# Patient Record
Sex: FEMALE | Race: WHITE | NOT HISPANIC OR LATINO | Employment: UNEMPLOYED | URBAN - METROPOLITAN AREA
[De-identification: names, ages, dates, MRNs, and addresses within clinical notes are randomized per-mention and may not be internally consistent; named-entity substitution may affect disease eponyms.]

---

## 2023-01-01 ENCOUNTER — NURSING HOME VISIT (OUTPATIENT)
Dept: POST ACUTE CARE | Facility: EXTERNAL LOCATION | Age: 88
End: 2023-01-01
Payer: MEDICARE

## 2023-01-01 DIAGNOSIS — I10 HYPERTENSION, UNSPECIFIED TYPE: ICD-10-CM

## 2023-01-01 DIAGNOSIS — K21.9 GASTROESOPHAGEAL REFLUX DISEASE WITHOUT ESOPHAGITIS: ICD-10-CM

## 2023-01-01 DIAGNOSIS — E87.6 HYPOKALEMIA: ICD-10-CM

## 2023-01-01 DIAGNOSIS — E11.9 TYPE 2 DIABETES MELLITUS WITHOUT COMPLICATION, WITHOUT LONG-TERM CURRENT USE OF INSULIN (MULTI): ICD-10-CM

## 2023-01-01 DIAGNOSIS — S91.002A WOUND OF LEFT ANKLE, INITIAL ENCOUNTER: ICD-10-CM

## 2023-01-01 DIAGNOSIS — R63.4 WEIGHT LOSS: ICD-10-CM

## 2023-01-01 DIAGNOSIS — R60.0 EDEMA OF LEFT LOWER EXTREMITY: Primary | ICD-10-CM

## 2023-01-01 DIAGNOSIS — K92.1 BLACK TARRY STOOLS: ICD-10-CM

## 2023-01-01 DIAGNOSIS — J18.9 PNEUMONIA DUE TO INFECTIOUS ORGANISM, UNSPECIFIED LATERALITY, UNSPECIFIED PART OF LUNG: Primary | ICD-10-CM

## 2023-01-01 DIAGNOSIS — I10 HYPERTENSION, ESSENTIAL: ICD-10-CM

## 2023-01-01 DIAGNOSIS — I10 PRIMARY HYPERTENSION: ICD-10-CM

## 2023-01-01 DIAGNOSIS — I21.4 NON-ST ELEVATION (NSTEMI) MYOCARDIAL INFARCTION (MULTI): ICD-10-CM

## 2023-01-01 DIAGNOSIS — J18.9 PNEUMONIA DUE TO INFECTIOUS ORGANISM, UNSPECIFIED LATERALITY, UNSPECIFIED PART OF LUNG: ICD-10-CM

## 2023-01-01 DIAGNOSIS — E11.9 TYPE 2 DIABETES MELLITUS WITHOUT COMPLICATION, WITH LONG-TERM CURRENT USE OF INSULIN (MULTI): ICD-10-CM

## 2023-01-01 DIAGNOSIS — K62.5 RECTAL BLEEDING: Primary | ICD-10-CM

## 2023-01-01 DIAGNOSIS — S91.002D WOUND OF LEFT ANKLE, SUBSEQUENT ENCOUNTER: ICD-10-CM

## 2023-01-01 DIAGNOSIS — R53.1 WEAKNESS: Primary | ICD-10-CM

## 2023-01-01 DIAGNOSIS — K62.5 RECTAL BLEEDING: ICD-10-CM

## 2023-01-01 DIAGNOSIS — E44.0 MODERATE PROTEIN-CALORIE MALNUTRITION (MULTI): Primary | ICD-10-CM

## 2023-01-01 DIAGNOSIS — S09.90XA CLOSED HEAD INJURY, INITIAL ENCOUNTER: ICD-10-CM

## 2023-01-01 DIAGNOSIS — R41.0 CONFUSION: ICD-10-CM

## 2023-01-01 DIAGNOSIS — Z79.4 TYPE 2 DIABETES MELLITUS WITHOUT COMPLICATION, WITH LONG-TERM CURRENT USE OF INSULIN (MULTI): ICD-10-CM

## 2023-01-01 DIAGNOSIS — G93.41 METABOLIC ENCEPHALOPATHY: ICD-10-CM

## 2023-01-01 DIAGNOSIS — R60.0 EDEMA OF LEFT LOWER EXTREMITY: ICD-10-CM

## 2023-01-01 DIAGNOSIS — J90 PLEURAL EFFUSION: ICD-10-CM
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
## 2023-01-01 LAB
ANION GAP IN SER/PLAS: 22 MMOL/L (ref 10–20)
APPEARANCE, URINE: CLEAR
BACTERIA, URINE: ABNORMAL /HPF
BILIRUBIN, URINE: NEGATIVE
BLOOD, URINE: NEGATIVE
CALCIUM (MG/DL) IN SER/PLAS: 9 MG/DL (ref 8.6–10.3)
CARBON DIOXIDE, TOTAL (MMOL/L) IN SER/PLAS: 21 MMOL/L (ref 21–32)
CHLORIDE (MMOL/L) IN SER/PLAS: 101 MMOL/L (ref 98–107)
COLOR, URINE: YELLOW
CREATININE (MG/DL) IN SER/PLAS: 1.26 MG/DL (ref 0.5–1.05)
ERYTHROCYTE DISTRIBUTION WIDTH (RATIO) BY AUTOMATED COUNT: 14.5 % (ref 11.5–14.5)
ERYTHROCYTE DISTRIBUTION WIDTH (RATIO) BY AUTOMATED COUNT: 15.8 % (ref 11.5–14.5)
ERYTHROCYTE MEAN CORPUSCULAR HEMOGLOBIN CONCENTRATION (G/DL) BY AUTOMATED: 30.3 G/DL (ref 32–36)
ERYTHROCYTE MEAN CORPUSCULAR HEMOGLOBIN CONCENTRATION (G/DL) BY AUTOMATED: 31.2 G/DL (ref 32–36)
ERYTHROCYTE MEAN CORPUSCULAR VOLUME (FL) BY AUTOMATED COUNT: 88 FL (ref 80–100)
ERYTHROCYTE MEAN CORPUSCULAR VOLUME (FL) BY AUTOMATED COUNT: 89 FL (ref 80–100)
ERYTHROCYTES (10*6/UL) IN BLOOD BY AUTOMATED COUNT: 4.15 X10E12/L (ref 4–5.2)
ERYTHROCYTES (10*6/UL) IN BLOOD BY AUTOMATED COUNT: 4.31 X10E12/L (ref 4–5.2)
GFR FEMALE: 38 ML/MIN/1.73M2
GLUCOSE (MG/DL) IN SER/PLAS: 125 MG/DL (ref 74–99)
GLUCOSE, URINE: NEGATIVE MG/DL
HEMATOCRIT (%) IN BLOOD BY AUTOMATED COUNT: 36.6 % (ref 36–46)
HEMATOCRIT (%) IN BLOOD BY AUTOMATED COUNT: 38.2 % (ref 36–46)
HEMOGLOBIN (G/DL) IN BLOOD: 11.1 G/DL (ref 12–16)
HEMOGLOBIN (G/DL) IN BLOOD: 11.9 G/DL (ref 12–16)
HYALINE CASTS, URINE: ABNORMAL /LPF
KETONES, URINE: NEGATIVE MG/DL
LEUKOCYTE ESTERASE, URINE: ABNORMAL
LEUKOCYTES (10*3/UL) IN BLOOD BY AUTOMATED COUNT: 8 X10E9/L (ref 4.4–11.3)
LEUKOCYTES (10*3/UL) IN BLOOD BY AUTOMATED COUNT: 9 X10E9/L (ref 4.4–11.3)
MUCUS, URINE: ABNORMAL /LPF
NITRITE, URINE: NEGATIVE
NRBC (PER 100 WBCS) BY AUTOMATED COUNT: 0.2 /100 WBC (ref 0–0)
PH, URINE: 5 (ref 5–8)
PLATELETS (10*3/UL) IN BLOOD AUTOMATED COUNT: 149 X10E9/L (ref 150–450)
PLATELETS (10*3/UL) IN BLOOD AUTOMATED COUNT: 175 X10E9/L (ref 150–450)
POTASSIUM (MMOL/L) IN SER/PLAS: 3.3 MMOL/L (ref 3.5–5.3)
PROTEIN, URINE: NEGATIVE MG/DL
RBC, URINE: 1 /HPF (ref 0–5)
SODIUM (MMOL/L) IN SER/PLAS: 141 MMOL/L (ref 136–145)
SPECIFIC GRAVITY, URINE: 1.01 (ref 1–1.03)
SQUAMOUS EPITHELIAL CELLS, URINE: 1 /HPF
UREA NITROGEN (MG/DL) IN SER/PLAS: 34 MG/DL (ref 6–23)
URINE CULTURE: ABNORMAL
UROBILINOGEN, URINE: <2 MG/DL (ref 0–1.9)
WBC, URINE: 2 /HPF (ref 0–5)

## 2023-01-01 PROCEDURE — 99309 SBSQ NF CARE MODERATE MDM 30: CPT | Performed by: INTERNAL MEDICINE

## 2023-01-01 PROCEDURE — 99308 SBSQ NF CARE LOW MDM 20: CPT | Performed by: NURSE PRACTITIONER

## 2023-01-01 PROCEDURE — 99308 SBSQ NF CARE LOW MDM 20: CPT | Performed by: INTERNAL MEDICINE

## 2023-01-01 PROCEDURE — 99309 SBSQ NF CARE MODERATE MDM 30: CPT | Performed by: NURSE PRACTITIONER

## 2023-01-01 PROCEDURE — 99497 ADVNCD CARE PLAN 30 MIN: CPT | Performed by: NURSE PRACTITIONER

## 2023-03-06 NOTE — LETTER
Subjective  Chief complaint: Paige Campoverde is a 89 y.o. female who is a long term resident patient being seen and evaluated for multiple medical problems.  Patient presents for confusion    HPI:  [employer] CNP called last week and reported patient with increased confusion, pulse ox 88, heart rate 50.  She stated that she examined the patient and noted lungs to be clear so she gave orders to obtain urine culture labs and chest x-ray.  Chest x-ray showed left airspace opacities and patient was started on antibiotic.  Labs showed low potassium which was replace.  Patient is feeling better.  Mentation at baseline.  No new concerns.  118/62, 98.1, 56        Review of Systems  All systems reviewed and negative except for what was mentioned in the HPI    Vital signs:, 118/62, 98.1, 56, 18, 97%    Objective  Physical Exam  Constitutional:       General: She is not in acute distress.  Eyes:      Extraocular Movements: Extraocular movements intact.   Cardiovascular:      Rate and Rhythm: Regular rhythm.   Pulmonary:      Effort: Pulmonary effort is normal.      Breath sounds: Normal breath sounds.   Abdominal:      General: Bowel sounds are normal.      Palpations: Abdomen is soft.   Musculoskeletal:      Cervical back: Neck supple.      Right lower leg: No edema.      Left lower leg: No edema.      Comments: Generalized weakness   Neurological:      Mental Status: She is alert.   Psychiatric:         Mood and Affect: Mood normal.         Behavior: Behavior is cooperative.         Assessment/Plan  Problem List Items Addressed This Visit       Confusion     Improved  Mentation at baseline  Continue to monitor         Hypertension     Controlled  Monitor blood pressure         Pneumonia - Primary     Improved with antibiotic          Medications, treatments, and labs reviewed  Continue medications and treatments as listed in [facility]

## 2023-03-07 PROBLEM — J18.9 PNEUMONIA: Status: ACTIVE | Noted: 2023-01-01

## 2023-03-07 PROBLEM — R41.0 CONFUSION: Status: ACTIVE | Noted: 2023-01-01

## 2023-03-07 PROBLEM — I10 HYPERTENSION: Status: ACTIVE | Noted: 2023-01-01

## 2023-03-07 NOTE — LETTER
Subjective  Chief complaint: Paige Campoverde is a 89 y.o. female who is a long term resident    HPI:  Patient presents for general medical care and f/u.  Patient seen and examined at bedside.  No issues per nursing.  Patient has no acute complaints.   Denies heartburn, regurgitation, epigastric discomfort, sour taste, and cough.    Denies chest pain and headache.  Patient denies any pain or discomfort.  No acute distress.        Review of Systems  All systems reviewed and negative except for what was mentioned in the HPI    Vital signs: 118/62, 98.1, 56, 18    Objective  Physical Exam  Constitutional:       General: She is not in acute distress.  Eyes:      Extraocular Movements: Extraocular movements intact.   Cardiovascular:      Rate and Rhythm: Regular rhythm.   Pulmonary:      Effort: Pulmonary effort is normal.      Breath sounds: Normal breath sounds.   Abdominal:      General: Bowel sounds are normal.      Palpations: Abdomen is soft.   Musculoskeletal:      Cervical back: Neck supple.      Right lower leg: No edema.      Left lower leg: No edema.   Neurological:      Mental Status: She is alert.   Psychiatric:         Mood and Affect: Mood normal.         Behavior: Behavior is cooperative.         Assessment/Plan  Problem List Items Addressed This Visit       GERD (gastroesophageal reflux disease)     GERD controlled         Hypertension     Continue to monitor BP         Non-ST elevation (NSTEMI) myocardial infarction (CMS/[facility])     Continue Eliquis.          Medications, treatments, and labs reviewed  Continue medications and treatments as listed in [facility]    Scribe Attestation  By signing my name below, abi PICKERING Scribe   attest that this documentation has been prepared under the direction and in the presence of Irene Fernandez MD.    Provider Attestation - Scribe documentation  All medical record entries made by the Scribe were at my direction and personally dictated by me. I have reviewed the  chart and agree that the record accurately reflects my personal performance of the history, physical exam, discussion and plan.

## 2023-03-09 PROBLEM — I21.4 NON-ST ELEVATION (NSTEMI) MYOCARDIAL INFARCTION (MULTI): Status: ACTIVE | Noted: 2023-01-01

## 2023-03-09 PROBLEM — E11.9 TYPE 2 DIABETES MELLITUS WITHOUT COMPLICATIONS (MULTI): Status: ACTIVE | Noted: 2023-01-01

## 2023-03-09 PROBLEM — K21.9 GERD (GASTROESOPHAGEAL REFLUX DISEASE): Status: ACTIVE | Noted: 2023-01-01

## 2023-03-09 PROBLEM — I51.7 CARDIOMEGALY: Status: ACTIVE | Noted: 2023-01-01

## 2023-03-09 NOTE — PROGRESS NOTES
Subjective   Chief complaint: Paige Campoverde is a 89 y.o. female who is a long term resident    HPI:  Patient presents for general medical care and f/u.  Patient seen and examined at bedside.  No issues per nursing.  Patient has no acute complaints.   Denies heartburn, regurgitation, epigastric discomfort, sour taste, and cough.    Denies chest pain and headache.  Patient denies any pain or discomfort.  No acute distress.        Review of Systems  All systems reviewed and negative except for what was mentioned in the HPI    Vital signs: 118/62, 98.1, 56, 18    Objective   Physical Exam  Constitutional:       General: She is not in acute distress.  Eyes:      Extraocular Movements: Extraocular movements intact.   Cardiovascular:      Rate and Rhythm: Regular rhythm.   Pulmonary:      Effort: Pulmonary effort is normal.      Breath sounds: Normal breath sounds.   Abdominal:      General: Bowel sounds are normal.      Palpations: Abdomen is soft.   Musculoskeletal:      Cervical back: Neck supple.      Right lower leg: No edema.      Left lower leg: No edema.   Neurological:      Mental Status: She is alert.   Psychiatric:         Mood and Affect: Mood normal.         Behavior: Behavior is cooperative.         Assessment/Plan   Problem List Items Addressed This Visit       GERD (gastroesophageal reflux disease)     GERD controlled         Hypertension     Continue to monitor BP         Non-ST elevation (NSTEMI) myocardial infarction (CMS/[facility])     Continue Eliquis.          Medications, treatments, and labs reviewed  Continue medications and treatments as listed in [facility]    Scribe Attestation  By signing my name below, abi PICKERING Scribe   attest that this documentation has been prepared under the direction and in the presence of Irene Fernandez MD.    Provider Attestation - Scribe documentation  All medical record entries made by the Scribe were at my direction and personally dictated by me. I have reviewed the  chart and agree that the record accurately reflects my personal performance of the history, physical exam, discussion and plan.

## 2023-04-04 NOTE — PROGRESS NOTES
Subjective   Chief complaint: Paige Campoverde is a 89 y.o. female who is a long term resident    HPI:  Patient presents for general medical care and f/u.  Patient seen and examined at bedside.  No issues per nursing.  Patient has no acute complaints.   Denies heartburn, regurgitation, epigastric discomfort, sour taste, and cough.    Denies chest pain and headache.  Deenis increased thirst or urinary frequency. Patient denies any pain or discomfort.  No acute distress.        Review of Systems  All systems reviewed and negative except for what was mentioned in the HPI    Vital signs: 118/62,  56, 18, 96%    Objective   Physical Exam  Constitutional:       General: She is not in acute distress.  Eyes:      Extraocular Movements: Extraocular movements intact.   Cardiovascular:      Rate and Rhythm: Regular rhythm.   Pulmonary:      Effort: Pulmonary effort is normal.      Breath sounds: Normal breath sounds.   Abdominal:      General: Bowel sounds are normal.      Palpations: Abdomen is soft.   Musculoskeletal:      Cervical back: Neck supple.      Right lower leg: No edema.      Left lower leg: No edema.   Neurological:      Mental Status: She is alert.   Psychiatric:         Mood and Affect: Mood normal.         Behavior: Behavior is cooperative.         Assessment/Plan   Problem List Items Addressed This Visit          Circulatory    Hypertension       Digestive    GERD (gastroesophageal reflux disease)     GERD controlled             Endocrine/Metabolic    Type 2 diabetes mellitus without complications (CMS/[facility])     Monitor BS  Continue current tx        Medications, treatments, and labs reviewed  Continue medications and treatments as listed in [facility]    Scribe Attestation  By signing my name below, abi PICKERING Scribe   attest that this documentation has been prepared under the direction and in the presence of Irene Fernandez MD.    Provider Attestation - Scribe documentation  All medical record entries made  by the Scribe were at my direction and personally dictated by me. I have reviewed the chart and agree that the record accurately reflects my personal performance of the history, physical exam, discussion and plan.

## 2023-04-04 NOTE — LETTER
Patient: Paige Campoverde  : 11/15/1922    Encounter Date: 2023    Subjective  Chief complaint: Paige Campoverde is a 89 y.o. female who is a long term resident    HPI:  Patient presents for general medical care and f/u.  Patient seen and examined at bedside.  No issues per nursing.  Patient has no acute complaints.   Denies heartburn, regurgitation, epigastric discomfort, sour taste, and cough.    Denies chest pain and headache.  Deenis increased thirst or urinary frequency. Patient denies any pain or discomfort.  No acute distress.        Review of Systems  All systems reviewed and negative except for what was mentioned in the HPI    Vital signs: 118/62,  56, 18, 96%    Objective  Physical Exam  Constitutional:       General: She is not in acute distress.  Eyes:      Extraocular Movements: Extraocular movements intact.   Cardiovascular:      Rate and Rhythm: Regular rhythm.   Pulmonary:      Effort: Pulmonary effort is normal.      Breath sounds: Normal breath sounds.   Abdominal:      General: Bowel sounds are normal.      Palpations: Abdomen is soft.   Musculoskeletal:      Cervical back: Neck supple.      Right lower leg: No edema.      Left lower leg: No edema.   Neurological:      Mental Status: She is alert.   Psychiatric:         Mood and Affect: Mood normal.         Behavior: Behavior is cooperative.         Assessment/Plan  Problem List Items Addressed This Visit          Circulatory    Hypertension       Digestive    GERD (gastroesophageal reflux disease)     GERD controlled             Endocrine/Metabolic    Type 2 diabetes mellitus without complications (CMS/[facility])     Monitor BS  Continue current tx        Medications, treatments, and labs reviewed  Continue medications and treatments as listed in [facility]    Scribe Attestation  By signing my name below, abi PICKERING Scribe   attest that this documentation has been prepared under the direction and in the presence of Irene Fernandez MD.    Provider  Attestation - Scribe documentation  All medical record entries made by the Scribe were at my direction and personally dictated by me. I have reviewed the chart and agree that the record accurately reflects my personal performance of the history, physical exam, discussion and plan.        Electronically Signed By: Irene Fernandez MD   4/4/23  6:22 PM

## 2023-04-28 NOTE — LETTER
Patient: Paige Campoverde  : 11/15/1922    Encounter Date: 2023    PROGRESS NOTE    Subjective  Chief complaint: Paige Campoverde is a 89 y.o. female who is a long term care patient being seen and evaluated for refusing medications    HPI:  According to nursing staff patient has been refusing her Eliquis. Despite nurses reattempts. She does understand the risks involved if she does not agree to take it. Denies bleeding, bruising or pain to her legs.       Objective  Vital signs: 126/73, 98%    Physical Exam  Constitutional:       General: She is not in acute distress.  Eyes:      Extraocular Movements: Extraocular movements intact.   Cardiovascular:      Rate and Rhythm: Normal rate and regular rhythm.   Pulmonary:      Effort: Pulmonary effort is normal.      Breath sounds: Normal breath sounds.   Abdominal:      General: Bowel sounds are normal.      Palpations: Abdomen is soft.   Musculoskeletal:      Cervical back: Neck supple.      Right lower leg: No edema.      Left lower leg: No edema.   Neurological:      Mental Status: She is alert.   Psychiatric:         Mood and Affect: Mood normal.         Behavior: Behavior is cooperative.         Assessment/Plan  Problem List Items Addressed This Visit          Circulatory    Non-ST elevation (NSTEMI) myocardial infarction (CMS/HCC)     Discontinue Eliquis.  Start Xarelto          Medications, treatments, and labs reviewed  Continue medications and treatments as listed in [facility]    Scribe Attestation  By signing my name below, ICarolyn Scribe   attest that this documentation has been prepared under the direction and in the presence of Irene Fernandez MD.    Provider Attestation - Scribe documentation  All medical record entries made by the Scribe were at my direction and personally dictated by me. I have reviewed the chart and agree that the record accurately reflects my personal performance of the history, physical exam, discussion and plan.    1. Non-ST  elevation (NSTEMI) myocardial infarction (CMS/HCC)               Electronically Signed By: Irene Fernandez MD   5/1/23  7:15 PM

## 2023-05-01 NOTE — PROGRESS NOTES
PROGRESS NOTE    Subjective   Chief complaint: Paige Campoverde is a 89 y.o. female who is a long term care patient being seen and evaluated for refusing medications    HPI:  According to nursing staff patient has been refusing her Eliquis. Despite nurses reattempts. She does understand the risks involved if she does not agree to take it. Denies bleeding, bruising or pain to her legs.       Objective   Vital signs: 126/73, 98%    Physical Exam  Constitutional:       General: She is not in acute distress.  Eyes:      Extraocular Movements: Extraocular movements intact.   Cardiovascular:      Rate and Rhythm: Normal rate and regular rhythm.   Pulmonary:      Effort: Pulmonary effort is normal.      Breath sounds: Normal breath sounds.   Abdominal:      General: Bowel sounds are normal.      Palpations: Abdomen is soft.   Musculoskeletal:      Cervical back: Neck supple.      Right lower leg: No edema.      Left lower leg: No edema.   Neurological:      Mental Status: She is alert.   Psychiatric:         Mood and Affect: Mood normal.         Behavior: Behavior is cooperative.         Assessment/Plan   Problem List Items Addressed This Visit          Circulatory    Non-ST elevation (NSTEMI) myocardial infarction (CMS/HCC)     Discontinue Eliquis.  Start Xarelto          Medications, treatments, and labs reviewed  Continue medications and treatments as listed in [facility]    Scribe Attestation  By signing my name below, ICarolyn Scribe   attest that this documentation has been prepared under the direction and in the presence of Irene Fernandez MD.    Provider Attestation - Scribe documentation  All medical record entries made by the Scribe were at my direction and personally dictated by me. I have reviewed the chart and agree that the record accurately reflects my personal performance of the history, physical exam, discussion and plan.    1. Non-ST elevation (NSTEMI) myocardial infarction (CMS/HCC)

## 2023-05-02 NOTE — LETTER
Patient: Paige Campoverde  : 11/15/1922    Encounter Date: 2023    Subjective  Chief complaint: Paige Campoverde is a 89 y.o. female who is a long term resident    HPI:  Patient presents for general medical care and f/u.  Patient seen and examined at bedside.  No issues per nursing.  Patient has no acute complaints.   Denies heartburn, regurgitation, epigastric discomfort, sour taste, and cough.    Denies chest pain and headache.  Deenis increased thirst or urinary frequency. Patient denies any pain or discomfort.  No acute distress.        Review of Systems  All systems reviewed and negative except for what was mentioned in the HPI    Vital signs: 116/64,  96%    Objective  Physical Exam  Constitutional:       General: She is not in acute distress.  Eyes:      Extraocular Movements: Extraocular movements intact.   Cardiovascular:      Rate and Rhythm: Regular rhythm.   Pulmonary:      Effort: Pulmonary effort is normal.      Breath sounds: Normal breath sounds.   Abdominal:      General: Bowel sounds are normal.      Palpations: Abdomen is soft.   Musculoskeletal:      Cervical back: Neck supple.      Right lower leg: No edema.      Left lower leg: No edema.   Neurological:      Mental Status: She is alert.   Psychiatric:         Mood and Affect: Mood normal.         Behavior: Behavior is cooperative.         Assessment/Plan  Problem List Items Addressed This Visit    None  Medications, treatments, and labs reviewed  Continue medications and treatments as listed in [facility]    Scribe Attestation  By signing my name below, abi PICKERING Scribe   attest that this documentation has been prepared under the direction and in the presence of Irene Fernandez MD.    Provider Attestation - Scribe documentation  All medical record entries made by the Scribe were at my direction and personally dictated by me. I have reviewed the chart and agree that the record accurately reflects my personal performance of the history,  physical exam, discussion and plan.    1. Gastroesophageal reflux disease without esophagitis        2. Primary hypertension        3. Type 2 diabetes mellitus without complication, with long-term current use of insulin (CMS/[facility])              Electronically Signed By: Irene Fernandez MD   5/3/23  4:17 PM

## 2023-05-03 NOTE — PROGRESS NOTES
Subjective   Chief complaint: Paige Campoverde is a 89 y.o. female who is a long term resident    HPI:  Patient presents for general medical care and f/u.  Patient seen and examined at bedside.  No issues per nursing.  Patient has no acute complaints.   Denies heartburn, regurgitation, epigastric discomfort, sour taste, and cough.    Denies chest pain and headache.  Deenis increased thirst or urinary frequency. Patient denies any pain or discomfort.  No acute distress.        Review of Systems  All systems reviewed and negative except for what was mentioned in the HPI    Vital signs: 116/64,  96%    Objective   Physical Exam  Constitutional:       General: She is not in acute distress.  Eyes:      Extraocular Movements: Extraocular movements intact.   Cardiovascular:      Rate and Rhythm: Regular rhythm.   Pulmonary:      Effort: Pulmonary effort is normal.      Breath sounds: Normal breath sounds.   Abdominal:      General: Bowel sounds are normal.      Palpations: Abdomen is soft.   Musculoskeletal:      Cervical back: Neck supple.      Right lower leg: No edema.      Left lower leg: No edema.   Neurological:      Mental Status: She is alert.   Psychiatric:         Mood and Affect: Mood normal.         Behavior: Behavior is cooperative.         Assessment/Plan   Problem List Items Addressed This Visit    None  Medications, treatments, and labs reviewed  Continue medications and treatments as listed in [facility]    Scribe Attestation  By signing my name below, Iabi Scribe   attest that this documentation has been prepared under the direction and in the presence of Irene Fernandez MD.    Provider Attestation - Scribe documentation  All medical record entries made by the Scribe were at my direction and personally dictated by me. I have reviewed the chart and agree that the record accurately reflects my personal performance of the history, physical exam, discussion and plan.    1. Gastroesophageal reflux  disease without esophagitis        2. Primary hypertension        3. Type 2 diabetes mellitus without complication, with long-term current use of insulin (CMS/HCC)

## 2023-05-15 PROBLEM — S91.002A WOUND OF LEFT ANKLE: Status: ACTIVE | Noted: 2023-01-01

## 2023-05-15 PROBLEM — R60.0 EDEMA OF LEFT LOWER EXTREMITY: Status: ACTIVE | Noted: 2023-01-01

## 2023-05-15 NOTE — LETTER
Patient: Paige Campoverde  : 11/15/1922    Encounter Date: 05/15/2023    PROGRESS NOTE    Subjective  Chief complaint: Paige Campoverde is a 89 y.o. female who is a long term care patient being seen and evaluated for weeping edema    HPI:  Nurse reporting patient with weeping edema to her left lower extremity.  Nurse states that patient had refused some doses of anticoagulants stating that she did not think she needed it.  Patient is on Lasix 20 mg daily.  Nurse has been wrapping her leg with Ace wrap but notes wetness to her sock from the weeping fluid.  Patient denies leg pain and shortness of breath.      Objective  Vital signs: 118/62, 98.1, 60, 16, 96%    Physical Exam  Constitutional:       General: She is not in acute distress.  Eyes:      Extraocular Movements: Extraocular movements intact.   Cardiovascular:      Rate and Rhythm: Normal rate and regular rhythm.   Pulmonary:      Effort: Pulmonary effort is normal.      Breath sounds: Normal breath sounds.   Musculoskeletal:      Cervical back: Neck supple.      Right lower leg: No edema.      Left lower leg: Edema present.      Comments: Generalized weakness   Skin:     Comments: Left lower extremity weeping edema, no redness, nontender  Left lateral ankle superficial open blister with pink wound bed and flat edges   Neurological:      Mental Status: She is alert.   Psychiatric:         Mood and Affect: Mood normal.         Behavior: Behavior is cooperative.         Assessment/Plan  Problem List Items Addressed This Visit       Edema of left lower extremity - Primary     Obtain ultrasound  Increase Lasix  Obtain BMP and CBC         Hypertension, essential     Controlled  Monitor blood pressure         Type 2 diabetes mellitus without complications (CMS/[facility])     Last A1c 7.3 on 2023 acceptable for age         Wound of left ankle     Irrigate with normal saline and apply Adaptic alginate ABD Curlex daily  Consult wound doctor  Ace wrap on in the a.m. off in  the p.m.          Medications, treatments, and labs reviewed  Continue medications and treatments as listed in [facility]    DANYA Hernandes      Electronically Signed By: DANYA Hernandes   5/15/23  4:09 PM

## 2023-05-15 NOTE — ASSESSMENT & PLAN NOTE
Irrigate with normal saline and apply Adaptic alginate ABD Curlex daily  Consult wound doctor  Ace wrap on in the a.m. off in the p.m.

## 2023-05-15 NOTE — PROGRESS NOTES
PROGRESS NOTE    Subjective   Chief complaint: Paige Campoverde is a 89 y.o. female who is a long term care patient being seen and evaluated for weeping edema    HPI:  Nurse reporting patient with weeping edema to her left lower extremity.  Nurse states that patient had refused some doses of anticoagulants stating that she did not think she needed it.  Patient is on Lasix 20 mg daily.  Nurse has been wrapping her leg with Ace wrap but notes wetness to her sock from the weeping fluid.  Patient denies leg pain and shortness of breath.      Objective   Vital signs: 118/62, 98.1, 60, 16, 96%    Physical Exam  Constitutional:       General: She is not in acute distress.  Eyes:      Extraocular Movements: Extraocular movements intact.   Cardiovascular:      Rate and Rhythm: Normal rate and regular rhythm.   Pulmonary:      Effort: Pulmonary effort is normal.      Breath sounds: Normal breath sounds.   Musculoskeletal:      Cervical back: Neck supple.      Right lower leg: No edema.      Left lower leg: Edema present.      Comments: Generalized weakness   Skin:     Comments: Left lower extremity weeping edema, no redness, nontender  Left lateral ankle superficial open blister with pink wound bed and flat edges   Neurological:      Mental Status: She is alert.   Psychiatric:         Mood and Affect: Mood normal.         Behavior: Behavior is cooperative.         Assessment/Plan   Problem List Items Addressed This Visit       Edema of left lower extremity - Primary     Obtain ultrasound  Increase Lasix  Obtain BMP and CBC         Hypertension, essential     Controlled  Monitor blood pressure         Type 2 diabetes mellitus without complications (CMS/[facility])     Last A1c 7.3 on 4/4/2023 acceptable for age         Wound of left ankle     Irrigate with normal saline and apply Adaptic alginate ABD Curlex daily  Consult wound doctor  Ace wrap on in the a.m. off in the p.m.          Medications, treatments, and labs  reviewed  Continue medications and treatments as listed in [facility]    Shala Kiran, APRN-CNP

## 2023-05-16 NOTE — LETTER
Patient: Paige Campoverde  : 11/15/1922    Encounter Date: 2023    PROGRESS NOTE    Subjective  Chief complaint: Paige Campoverde is a 89 y.o. female who is a long term care patient being seen and evaluated for edema    HPI:  Patient presented with LLE edema US pending, Labs also pending after increasing lasix for edema. No further concerns at this time, No acute distress.       Objective  Vital signs: 122/67, 95%    Physical Exam  Constitutional:       General: She is not in acute distress.  Eyes:      Extraocular Movements: Extraocular movements intact.   Cardiovascular:      Rate and Rhythm: Normal rate and regular rhythm.   Pulmonary:      Effort: Pulmonary effort is normal.      Breath sounds: Normal breath sounds.   Abdominal:      General: Bowel sounds are normal.      Palpations: Abdomen is soft.   Musculoskeletal:         General: Normal range of motion.      Cervical back: Neck supple.      Right lower leg: No edema.      Left lower leg: Edema present.   Skin:     General: Skin is warm and dry.      Comments: Dry scaly skin to BLE   Neurological:      Mental Status: She is alert.   Psychiatric:         Mood and Affect: Mood normal.         Behavior: Behavior is cooperative.         Assessment/Plan  Problem List Items Addressed This Visit          Musculoskeletal    Edema of left lower extremity     Obtain ultrasound  Increase Lasix  BMP and CBC pending          Medications, treatments, and labs reviewed  Continue medications and treatments as listed in [facility]    Scribe Attestation  By signing my name below, ICarolyn Scribe   attest that this documentation has been prepared under the direction and in the presence of Irene Fernandez MD.    Provider Attestation - Scribe documentation  All medical record entries made by the Scribe were at my direction and personally dictated by me. I have reviewed the chart and agree that the record accurately reflects my personal performance of the history, physical  exam, discussion and plan.    1. Edema of left lower extremity               Electronically Signed By: Irene Fernandez MD   5/17/23  8:20 PM

## 2023-05-17 NOTE — PROGRESS NOTES
PROGRESS NOTE    Subjective   Chief complaint: Paige Campoverde is a 89 y.o. female who is a long term care patient being seen and evaluated for edema    HPI:  Patient presented with LLE edema US pending, Labs also pending after increasing lasix for edema. No further concerns at this time, No acute distress.       Objective   Vital signs: 122/67, 95%    Physical Exam  Constitutional:       General: She is not in acute distress.  Eyes:      Extraocular Movements: Extraocular movements intact.   Cardiovascular:      Rate and Rhythm: Normal rate and regular rhythm.   Pulmonary:      Effort: Pulmonary effort is normal.      Breath sounds: Normal breath sounds.   Abdominal:      General: Bowel sounds are normal.      Palpations: Abdomen is soft.   Musculoskeletal:         General: Normal range of motion.      Cervical back: Neck supple.      Right lower leg: No edema.      Left lower leg: Edema present.   Skin:     General: Skin is warm and dry.      Comments: Dry scaly skin to BLE   Neurological:      Mental Status: She is alert.   Psychiatric:         Mood and Affect: Mood normal.         Behavior: Behavior is cooperative.         Assessment/Plan   Problem List Items Addressed This Visit          Musculoskeletal    Edema of left lower extremity     Obtain ultrasound  Increase Lasix  BMP and CBC pending          Medications, treatments, and labs reviewed  Continue medications and treatments as listed in [facility]    Scribe Attestation  By signing my name below, ICarolyn Scribjorge a   attest that this documentation has been prepared under the direction and in the presence of Irene Fernandez MD.    Provider Attestation - Scribe documentation  All medical record entries made by the Scribe were at my direction and personally dictated by me. I have reviewed the chart and agree that the record accurately reflects my personal performance of the history, physical exam, discussion and plan.    1. Edema of left lower extremity

## 2023-05-18 NOTE — PROGRESS NOTES
PROGRESS NOTE    Subjective   Chief complaint: Paige Campoverde is a 89 y.o. female who is a long term care patient being seen and evaluated for hypoxia    HPI:  Nurse reporting that patient was noted to have hypoxia and hallucinations.  CXR obtained which showed infiltrate and effusion.  Patient has had 15 lb wt gain as well.  She had increase in lasix last week d/t edema.  She was started on augmentin.  Patient denies sob cough n/v/f/c.      Objective   Vital signs: 94/54, 98.0, 50, 28, 96%    Physical Exam  Constitutional:       General: She is not in acute distress.  Eyes:      Extraocular Movements: Extraocular movements intact.   Cardiovascular:      Rate and Rhythm: Normal rate and regular rhythm.   Pulmonary:      Effort: Pulmonary effort is normal.      Breath sounds: Decreased breath sounds present.      Comments: O2  Musculoskeletal:      Cervical back: Neck supple.      Right lower leg: No edema.      Left lower leg: No edema.   Neurological:      Mental Status: She is alert.   Psychiatric:         Mood and Affect: Mood normal.         Behavior: Behavior is cooperative.         Assessment/Plan   Problem List Items Addressed This Visit       Hypertension, essential     Controlled  Monitor blood pressure         Metabolic encephalopathy     Monitor         Pleural effusion     Lasix          Pneumonia - Primary     Augmentin  O2  Aerosol tx          Medications, treatments, and labs reviewed  Continue medications and treatments as listed in [facility]    Shala Kiran, APRN-CNP

## 2023-05-18 NOTE — LETTER
Patient: Paige Campoverde  : 11/15/1922    Encounter Date: 2023    PROGRESS NOTE    Subjective  Chief complaint: Paige Campoverde is a 89 y.o. female who is a long term care patient being seen and evaluated for hypoxia    HPI:  Nurse reporting that patient was noted to have hypoxia and hallucinations.  CXR obtained which showed infiltrate and effusion.  Patient has had 15 lb wt gain as well.  She had increase in lasix last week d/t edema.  She was started on augmentin.  Patient denies sob cough n/v/f/c.      Objective  Vital signs: 94/54, 98.0, 50, 28, 96%    Physical Exam  Constitutional:       General: She is not in acute distress.  Eyes:      Extraocular Movements: Extraocular movements intact.   Cardiovascular:      Rate and Rhythm: Normal rate and regular rhythm.   Pulmonary:      Effort: Pulmonary effort is normal.      Breath sounds: Decreased breath sounds present.      Comments: O2  Musculoskeletal:      Cervical back: Neck supple.      Right lower leg: No edema.      Left lower leg: No edema.   Neurological:      Mental Status: She is alert.   Psychiatric:         Mood and Affect: Mood normal.         Behavior: Behavior is cooperative.         Assessment/Plan  Problem List Items Addressed This Visit       Hypertension, essential     Controlled  Monitor blood pressure         Metabolic encephalopathy     Monitor         Pleural effusion     Lasix          Pneumonia - Primary     Augmentin  O2  Aerosol tx          Medications, treatments, and labs reviewed  Continue medications and treatments as listed in [facility]    DANYA Hernandes      Electronically Signed By: DANYA Hernandes   23  5:46 PM

## 2023-05-19 NOTE — LETTER
Patient: Paige Campoverde  : 11/15/1922    Encounter Date: 2023    PROGRESS NOTE    Subjective  Chief complaint: Paige Campoverde is a 89 y.o. female who is a long term care patient being seen and evaluated for PNA    HPI:    Patient continues on antibiotic for pneumonia.  Denies cough, shortness of breath, fever chills nausea vomiting or diarrhea.  Patient also had effusion and 15 pound weight gain Lasix recently increased. Denies SOB.       Objective  Vital signs: 132/74, 96%    Physical Exam  Constitutional:       General: She is not in acute distress.  Eyes:      Extraocular Movements: Extraocular movements intact.   Cardiovascular:      Rate and Rhythm: Normal rate and regular rhythm.   Pulmonary:      Effort: Pulmonary effort is normal.      Breath sounds: Normal breath sounds.   Abdominal:      General: Bowel sounds are normal.      Palpations: Abdomen is soft.   Musculoskeletal:      Cervical back: Neck supple.      Right lower leg: Edema present.      Left lower leg: Edema present.   Neurological:      Mental Status: She is alert.   Psychiatric:         Mood and Affect: Mood normal.         Behavior: Behavior is cooperative.         Assessment/Plan  Problem List Items Addressed This Visit          Respiratory    Pneumonia     Improving with antibiotic            Musculoskeletal    Edema of left lower extremity     Continue Lasix  BMP and CBC pending         Wound of left ankle     Irrigate with normal saline and apply Adaptic alginate ABD Curlex daily  Consult wound doctor  Ace wrap on in the a.m. off in the p.m.          Medications, treatments, and labs reviewed  Continue medications and treatments as listed in [facility]    Scribe Attestation  By signing my name below, ICarolyn Scribe   attest that this documentation has been prepared under the direction and in the presence of Irene Fernandez MD.    Provider Attestation - Scribe documentation  All medical record entries made by the Scribe were at  my direction and personally dictated by me. I have reviewed the chart and agree that the record accurately reflects my personal performance of the history, physical exam, discussion and plan.    1. Edema of left lower extremity        2. Pneumonia due to infectious organism, unspecified laterality, unspecified part of lung        3. Wound of left ankle, subsequent encounter               Electronically Signed By: Irene Fernandez MD   5/22/23  2:34 PM

## 2023-05-22 NOTE — PROGRESS NOTES
PROGRESS NOTE    Subjective   Chief complaint: Paige Campoverde is a 89 y.o. female who is a long term care patient being seen and evaluated for PNA    HPI:    Patient continues on antibiotic for pneumonia.  Denies cough, shortness of breath, fever chills nausea vomiting or diarrhea.  Patient also had effusion and 15 pound weight gain Lasix recently increased. Denies SOB.       Objective   Vital signs: 132/74, 96%    Physical Exam  Constitutional:       General: She is not in acute distress.  Eyes:      Extraocular Movements: Extraocular movements intact.   Cardiovascular:      Rate and Rhythm: Normal rate and regular rhythm.   Pulmonary:      Effort: Pulmonary effort is normal.      Breath sounds: Normal breath sounds.   Abdominal:      General: Bowel sounds are normal.      Palpations: Abdomen is soft.   Musculoskeletal:      Cervical back: Neck supple.      Right lower leg: Edema present.      Left lower leg: Edema present.   Neurological:      Mental Status: She is alert.   Psychiatric:         Mood and Affect: Mood normal.         Behavior: Behavior is cooperative.         Assessment/Plan   Problem List Items Addressed This Visit          Respiratory    Pneumonia     Improving with antibiotic            Musculoskeletal    Edema of left lower extremity     Continue Lasix  BMP and CBC pending         Wound of left ankle     Irrigate with normal saline and apply Adaptic alginate ABD Curlex daily  Consult wound doctor  Ace wrap on in the a.m. off in the p.m.          Medications, treatments, and labs reviewed  Continue medications and treatments as listed in [facility]    Scribe Attestation  By signing my name below, ICarolyn Scribe   attest that this documentation has been prepared under the direction and in the presence of Irene Fernandez MD.    Provider Attestation - Scribe documentation  All medical record entries made by the Scribe were at my direction and personally dictated by me. I have reviewed the chart  and agree that the record accurately reflects my personal performance of the history, physical exam, discussion and plan.    1. Edema of left lower extremity        2. Pneumonia due to infectious organism, unspecified laterality, unspecified part of lung        3. Wound of left ankle, subsequent encounter

## 2023-05-23 PROBLEM — G93.41 METABOLIC ENCEPHALOPATHY: Status: ACTIVE | Noted: 2023-01-01

## 2023-05-23 PROBLEM — J90 PLEURAL EFFUSION: Status: ACTIVE | Noted: 2023-01-01

## 2023-05-23 NOTE — LETTER
Patient: Paige Campoverde  : 11/15/1922    Encounter Date: 2023    PROGRESS NOTE    Subjective  Chief complaint: Paige Campoverde is a 89 y.o. female who is a long term care patient being seen and evaluated for PNA, weight gain, dark loose stool    HPI:  Patient with PNA continues on ATB. Denies fever, chills, nausea or vomiting. Patient had recent 15 pound weight gain and pleural effusion, but had recent increase of her Lasix. Recent labs revealed Low potassium level, Potassium was replaced. Patient also complained of loose dark tarry stools, Denies nausea or vomiting. No other issues at this time.      Objective  Vital signs: 132/74, 98%    Physical Exam  Constitutional:       General: She is not in acute distress.  Eyes:      Extraocular Movements: Extraocular movements intact.   Cardiovascular:      Rate and Rhythm: Normal rate and regular rhythm.   Pulmonary:      Effort: Pulmonary effort is normal.      Breath sounds: Normal breath sounds.   Abdominal:      General: Bowel sounds are normal.      Palpations: Abdomen is soft.   Musculoskeletal:      Cervical back: Neck supple.      Right lower leg: No edema.      Left lower leg: No edema.   Neurological:      Mental Status: She is alert.   Psychiatric:         Mood and Affect: Mood normal.         Behavior: Behavior is cooperative.         Assessment/Plan  Problem List Items Addressed This Visit          Respiratory    PNA (pneumonia)     Continue ATB            Other    Black tarry stools     Loose  Send stool for Cdiff  Obtain Stool for OB         Hypokalemia     Labs reviewed revealed K+ 3.4, all others unremarkable  Potassium replaced  Repeat lab in 1 week            Medications, treatments, and labs reviewed  Continue medications and treatments as listed in [facility]    Scribe Attestation  By signing my name below, I, Carolyn Rodriguez, Scribe   attest that this documentation has been prepared under the direction and in the presence of Irene Fernandez  MD.    Provider Attestation - Scribe documentation  All medical record entries made by the Scribe were at my direction and personally dictated by me. I have reviewed the chart and agree that the record accurately reflects my personal performance of the history, physical exam, discussion and plan.    1. Pneumonia due to infectious organism, unspecified laterality, unspecified part of lung        2. Black tarry stools        3. Hypokalemia               Electronically Signed By: Irene Fernandez MD   5/24/23  4:21 PM

## 2023-05-24 PROBLEM — E87.6 HYPOKALEMIA: Status: ACTIVE | Noted: 2023-01-01

## 2023-05-24 PROBLEM — K92.1 BLACK TARRY STOOLS: Status: ACTIVE | Noted: 2023-01-01

## 2023-05-24 PROBLEM — R63.5 WEIGHT GAIN: Status: ACTIVE | Noted: 2023-01-01

## 2023-05-24 NOTE — ASSESSMENT & PLAN NOTE
Labs reviewed revealed K+ 3.4, all others unremarkable  Potassium replaced  Repeat lab in 1 week

## 2023-05-24 NOTE — LETTER
Patient: Paige Campoverde  : 11/15/1922    Encounter Date: 2023    PROGRESS NOTE    Subjective  Chief complaint: Paige Campoverde is a 89 y.o. female who is a long term care patient being seen and evaluated for weight loss and rectal bleeding    HPI:  89-year-old female presents with complaint of weight loss and rectal bleeding.  Patient has had a 10% weight loss however she did have a noted weight gain prior to that with associated leg edema and increase in diuretic.  Since then swelling in her legs has improved.  In addition nursing staff reports they noted patient with a large amount of dark rectal bleeding today.  She is on Xarelto.  She denies abdominal pain nausea vomiting fever chills.      Objective  Vital signs: 117/58, 98.0    Physical Exam  Constitutional:       General: She is not in acute distress.  Eyes:      Extraocular Movements: Extraocular movements intact.   Cardiovascular:      Rate and Rhythm: Normal rate and regular rhythm.   Pulmonary:      Effort: Pulmonary effort is normal.      Breath sounds: Normal breath sounds.   Abdominal:      General: Bowel sounds are normal.      Palpations: Abdomen is soft.   Musculoskeletal:      Cervical back: Neck supple.      Right lower leg: No edema.      Left lower leg: No edema.      Comments: Generalized weakness   Neurological:      Mental Status: She is alert.   Psychiatric:         Mood and Affect: Mood normal.         Behavior: Behavior is cooperative.         Assessment/Plan  Problem List Items Addressed This Visit       Hypertension, essential     Controlled  Monitor blood pressure         Rectal bleeding - Primary     Hold anticoagulant  Start pantoprazole  Obtain CBC stat         Weight loss     Likely secondary to recent increase in diuretic, edema improved  Dietitian following  Continue to monitor            Medications, treatments, and labs reviewed  Continue medications and treatments as listed in [facility]    Discussed advanced directives with  patient including CODE STATUS and need for hospitalization.  Patient refusing hospital at this time.  She would like to be kept comfortable with no aggressive measures.  She is aware of above noted plan and agrees.    Time spent 20 minutes  DANYA Hernandes    Electronically Signed By: DANYA Hernandes   5/27/23 11:36 AM

## 2023-05-24 NOTE — PROGRESS NOTES
PROGRESS NOTE    Subjective   Chief complaint: Paige Campoverde is a 89 y.o. female who is a long term care patient being seen and evaluated for PNA, weight gain, dark loose stool    HPI:  Patient with PNA continues on ATB. Denies fever, chills, nausea or vomiting. Patient had recent 15 pound weight gain and pleural effusion, but had recent increase of her Lasix. Recent labs revealed Low potassium level, Potassium was replaced. Patient also complained of loose dark tarry stools, Denies nausea or vomiting. No other issues at this time.      Objective   Vital signs: 132/74, 98%    Physical Exam  Constitutional:       General: She is not in acute distress.  Eyes:      Extraocular Movements: Extraocular movements intact.   Cardiovascular:      Rate and Rhythm: Normal rate and regular rhythm.   Pulmonary:      Effort: Pulmonary effort is normal.      Breath sounds: Normal breath sounds.   Abdominal:      General: Bowel sounds are normal.      Palpations: Abdomen is soft.   Musculoskeletal:      Cervical back: Neck supple.      Right lower leg: No edema.      Left lower leg: No edema.   Neurological:      Mental Status: She is alert.   Psychiatric:         Mood and Affect: Mood normal.         Behavior: Behavior is cooperative.         Assessment/Plan   Problem List Items Addressed This Visit          Respiratory    PNA (pneumonia)     Continue ATB            Other    Black tarry stools     Loose  Send stool for Cdiff  Obtain Stool for OB         Hypokalemia     Labs reviewed revealed K+ 3.4, all others unremarkable  Potassium replaced  Repeat lab in 1 week            Medications, treatments, and labs reviewed  Continue medications and treatments as listed in [facility]    Scribe Attestation  By signing my name below, ICarolyn, Scribe   attest that this documentation has been prepared under the direction and in the presence of Irene Fernandez MD.    Provider Attestation - Scribe documentation  All medical record entries  made by the Scribe were at my direction and personally dictated by me. I have reviewed the chart and agree that the record accurately reflects my personal performance of the history, physical exam, discussion and plan.    1. Pneumonia due to infectious organism, unspecified laterality, unspecified part of lung        2. Black tarry stools        3. Hypokalemia

## 2023-05-26 NOTE — LETTER
Patient: Paige Campoverde  : 11/15/1922    Encounter Date: 2023    PROGRESS NOTE    Subjective  Chief complaint: Paige Campoverde is a 89 y.o. female who is a long term care patient being seen and evaluated for rectal bleeding    HPI:  Nurse reports patient with rectal bleeding. Denies ABD pain, nausea or vomiting. No other concerns at this time. No acute distress.       Objective  Vital signs: 126/74, 98%    Physical Exam  Constitutional:       General: She is not in acute distress.  Eyes:      Extraocular Movements: Extraocular movements intact.   Cardiovascular:      Rate and Rhythm: Normal rate and regular rhythm.   Pulmonary:      Effort: Pulmonary effort is normal.      Breath sounds: Normal breath sounds.   Abdominal:      General: Bowel sounds are normal.      Palpations: Abdomen is soft.   Musculoskeletal:      Cervical back: Neck supple.      Right lower leg: No edema.      Left lower leg: No edema.   Neurological:      Mental Status: She is alert.   Psychiatric:         Mood and Affect: Mood normal.         Behavior: Behavior is cooperative.         Assessment/Plan  Problem List Items Addressed This Visit          Digestive    Rectal bleeding     Hold anticoagulant  Start pantoprazole  CBC pending          Medications, treatments, and labs reviewed  Continue medications and treatments as listed in [facility]    Scribe Attestation  By signing my name below, ICraolyn Scribjorge a   attest that this documentation has been prepared under the direction and in the presence of Irene Fernandez MD.    Provider Attestation - Scribe documentation  All medical record entries made by the Scribe were at my direction and personally dictated by me. I have reviewed the chart and agree that the record accurately reflects my personal performance of the history, physical exam, discussion and plan.    1. Rectal bleeding               Electronically Signed By: Irene Fernandez MD   23  7:16 PM

## 2023-05-27 PROBLEM — R41.0 CONFUSION: Status: RESOLVED | Noted: 2023-01-01 | Resolved: 2023-01-01

## 2023-05-27 PROBLEM — R60.0 EDEMA OF LEFT LOWER EXTREMITY: Status: RESOLVED | Noted: 2023-01-01 | Resolved: 2023-01-01

## 2023-05-27 PROBLEM — K62.5 RECTAL BLEEDING: Status: ACTIVE | Noted: 2023-01-01

## 2023-05-27 PROBLEM — E87.6 HYPOKALEMIA: Status: RESOLVED | Noted: 2023-01-01 | Resolved: 2023-01-01

## 2023-05-27 PROBLEM — R63.4 WEIGHT LOSS: Status: ACTIVE | Noted: 2023-01-01

## 2023-05-27 PROBLEM — I21.4 NON-ST ELEVATION (NSTEMI) MYOCARDIAL INFARCTION (MULTI): Status: RESOLVED | Noted: 2023-01-01 | Resolved: 2023-01-01

## 2023-05-27 NOTE — ASSESSMENT & PLAN NOTE
Likely secondary to recent increase in diuretic, edema improved  Dietitian following  Continue to monitor

## 2023-05-27 NOTE — PROGRESS NOTES
PROGRESS NOTE    Subjective   Chief complaint: Paige Campoverde is a 89 y.o. female who is a long term care patient being seen and evaluated for weight loss and rectal bleeding    HPI:  89-year-old female presents with complaint of weight loss and rectal bleeding.  Patient has had a 10% weight loss however she did have a noted weight gain prior to that with associated leg edema and increase in diuretic.  Since then swelling in her legs has improved.  In addition nursing staff reports they noted patient with a large amount of dark rectal bleeding today.  She is on Xarelto.  She denies abdominal pain nausea vomiting fever chills.      Objective   Vital signs: 117/58, 98.0    Physical Exam  Constitutional:       General: She is not in acute distress.  Eyes:      Extraocular Movements: Extraocular movements intact.   Cardiovascular:      Rate and Rhythm: Normal rate and regular rhythm.   Pulmonary:      Effort: Pulmonary effort is normal.      Breath sounds: Normal breath sounds.   Abdominal:      General: Bowel sounds are normal.      Palpations: Abdomen is soft.   Musculoskeletal:      Cervical back: Neck supple.      Right lower leg: No edema.      Left lower leg: No edema.      Comments: Generalized weakness   Neurological:      Mental Status: She is alert.   Psychiatric:         Mood and Affect: Mood normal.         Behavior: Behavior is cooperative.         Assessment/Plan   Problem List Items Addressed This Visit       Hypertension, essential     Controlled  Monitor blood pressure         Rectal bleeding - Primary     Hold anticoagulant  Start pantoprazole  Obtain CBC stat         Weight loss     Likely secondary to recent increase in diuretic, edema improved  Dietitian following  Continue to monitor            Medications, treatments, and labs reviewed  Continue medications and treatments as listed in [facility]    Discussed advanced directives with patient including CODE STATUS and need for hospitalization.  Patient  refusing hospital at this time.  She would like to be kept comfortable with no aggressive measures.  She is aware of above noted plan and agrees.    Time spent 20 minutes  Shala Kiran, JACOBO-CNP

## 2023-05-30 NOTE — PROGRESS NOTES
PROGRESS NOTE    Subjective   Chief complaint: Paige Campoverde is a 89 y.o. female who is a long term care patient being seen and evaluated for rectal bleeding    HPI:  Nurse reports patient with rectal bleeding. Denies ABD pain, nausea or vomiting. No other concerns at this time. No acute distress.       Objective   Vital signs: 126/74, 98%    Physical Exam  Constitutional:       General: She is not in acute distress.  Eyes:      Extraocular Movements: Extraocular movements intact.   Cardiovascular:      Rate and Rhythm: Normal rate and regular rhythm.   Pulmonary:      Effort: Pulmonary effort is normal.      Breath sounds: Normal breath sounds.   Abdominal:      General: Bowel sounds are normal.      Palpations: Abdomen is soft.   Musculoskeletal:      Cervical back: Neck supple.      Right lower leg: No edema.      Left lower leg: No edema.   Neurological:      Mental Status: She is alert.   Psychiatric:         Mood and Affect: Mood normal.         Behavior: Behavior is cooperative.         Assessment/Plan   Problem List Items Addressed This Visit          Digestive    Rectal bleeding     Hold anticoagulant  Start pantoprazole  CBC pending          Medications, treatments, and labs reviewed  Continue medications and treatments as listed in [facility]    Scribe Attestation  By signing my name below, ICarolyn Scribe   attest that this documentation has been prepared under the direction and in the presence of Irene Fernandez MD.    Provider Attestation - Scribe documentation  All medical record entries made by the Scribe were at my direction and personally dictated by me. I have reviewed the chart and agree that the record accurately reflects my personal performance of the history, physical exam, discussion and plan.    1. Rectal bleeding

## 2023-06-06 NOTE — LETTER
Patient: Paige Campoverde  : 11/15/1922    Encounter Date: 2023    Subjective  Chief complaint: Paige Campoverde is a 89 y.o. female who is a long term resident  presents for general medical care   HPI:  Patient presents for general medical care and f/u.  Patient seen and examined at bedside.  No issues per nursing.  Patient has no acute complaints.   Denies heartburn, regurgitation, epigastric discomfort, sour taste, and cough.    Denies chest pain and headache.  Deenis increased thirst or urinary frequency. Patient denies any pain or discomfort.  No acute distress.        Review of Systems  All systems reviewed and negative except for what was mentioned in the HPI    Vital signs: 116/64,  96%    Objective  Physical Exam  Constitutional:       General: She is not in acute distress.  Eyes:      Extraocular Movements: Extraocular movements intact.   Cardiovascular:      Rate and Rhythm: Regular rhythm.   Pulmonary:      Effort: Pulmonary effort is normal.      Breath sounds: Normal breath sounds.   Abdominal:      General: Bowel sounds are normal.      Palpations: Abdomen is soft.   Musculoskeletal:      Cervical back: Neck supple.      Right lower leg: No edema.      Left lower leg: No edema.   Neurological:      Mental Status: She is alert.   Psychiatric:         Mood and Affect: Mood normal.         Behavior: Behavior is cooperative.         Assessment/Plan  Problem List Items Addressed This Visit          Circulatory    Hypertension, essential     Controlled  Monitor blood pressure            Digestive    GERD (gastroesophageal reflux disease)     GERD controlled             Endocrine/Metabolic    Type 2 diabetes mellitus without complications (CMS/[facility])     Last A1c 7.3 on 2023 acceptable for age  Continue to monitor BS        Medications, treatments, and labs reviewed  Continue medications and treatments as listed in [facility]    Scribe Attestation  By signing my name below, abi PICKERING , Anaibe   attest  that this documentation has been prepared under the direction and in the presence of Irene Fernandez MD.    Provider Attestation - Scribe documentation  All medical record entries made by the Scribe were at my direction and personally dictated by me. I have reviewed the chart and agree that the record accurately reflects my personal performance of the history, physical exam, discussion and plan.    1. Gastroesophageal reflux disease without esophagitis        2. Hypertension, essential        3. Type 2 diabetes mellitus without complication, without long-term current use of insulin (CMS/[facility])              Electronically Signed By: Irene Fernandez MD   6/7/23  5:42 PM

## 2023-06-07 NOTE — PROGRESS NOTES
Subjective   Chief complaint: Paige Campoverde is a 89 y.o. female who is a long term resident  presents for general medical care   HPI:  Patient presents for general medical care and f/u.  Patient seen and examined at bedside.  No issues per nursing.  Patient has no acute complaints.   Denies heartburn, regurgitation, epigastric discomfort, sour taste, and cough.    Denies chest pain and headache.  Deenis increased thirst or urinary frequency. Patient denies any pain or discomfort.  No acute distress.        Review of Systems  All systems reviewed and negative except for what was mentioned in the HPI    Vital signs: 116/64,  96%    Objective   Physical Exam  Constitutional:       General: She is not in acute distress.  Eyes:      Extraocular Movements: Extraocular movements intact.   Cardiovascular:      Rate and Rhythm: Regular rhythm.   Pulmonary:      Effort: Pulmonary effort is normal.      Breath sounds: Normal breath sounds.   Abdominal:      General: Bowel sounds are normal.      Palpations: Abdomen is soft.   Musculoskeletal:      Cervical back: Neck supple.      Right lower leg: No edema.      Left lower leg: No edema.   Neurological:      Mental Status: She is alert.   Psychiatric:         Mood and Affect: Mood normal.         Behavior: Behavior is cooperative.         Assessment/Plan   Problem List Items Addressed This Visit          Circulatory    Hypertension, essential     Controlled  Monitor blood pressure            Digestive    GERD (gastroesophageal reflux disease)     GERD controlled             Endocrine/Metabolic    Type 2 diabetes mellitus without complications (CMS/[facility])     Last A1c 7.3 on 4/4/2023 acceptable for age  Continue to monitor BS        Medications, treatments, and labs reviewed  Continue medications and treatments as listed in [facility]    Scribe Attestation  By signing my name below, abi PICKERING Scribe   attest that this documentation has been prepared under the direction and in  the presence of Irene Fernandez MD.    Provider Attestation - Scribe documentation  All medical record entries made by the Scribe were at my direction and personally dictated by me. I have reviewed the chart and agree that the record accurately reflects my personal performance of the history, physical exam, discussion and plan.    1. Gastroesophageal reflux disease without esophagitis        2. Hypertension, essential        3. Type 2 diabetes mellitus without complication, without long-term current use of insulin (CMS/[facility])

## 2023-06-19 NOTE — PROGRESS NOTES
PROGRESS NOTE    Subjective   Chief complaint: Paige Campoverde is a 89 y.o. female who is a long term care patient being seen and evaluated for fall    HPI:  Nurse called on Friday and reported patient had a fall sustaining a skin tear to the right shin and striking her head.  Patient states that she fell asleep while sitting in wheelchair and fell forward out of the chair.  Patient was sent to the ED for evaluation and returned with no new orders.  Patient states that she is feeling okay.  She denies headache and vision changes.  No other concerns at this time.      Objective   Vital signs: 140/78, 97.8, 70, 18, 93%    Physical Exam  Constitutional:       General: She is not in acute distress.  Eyes:      Extraocular Movements: Extraocular movements intact.   Pulmonary:      Effort: Pulmonary effort is normal.      Comments: O2  Musculoskeletal:         General: Normal range of motion.      Cervical back: Neck supple.      Right lower leg: Edema present.      Left lower leg: Edema present.   Skin:     Comments: Bruising to forehead and left lower extremity  Dressing intact to the right lower extremity   Neurological:      Mental Status: She is alert.   Psychiatric:         Mood and Affect: Mood normal.         Behavior: Behavior is cooperative.         Assessment/Plan   Problem List Items Addressed This Visit       Closed head injury     Evaluation in ED unremarkable  Monitor neurochecks         Fall     Sustained bruising to forehead and left lower extremity as well as skin tear to the right lower extremity         Hypertension, essential     Blood pressure at goal for age  Monitor blood pressure         Weakness - Primary     Medications, treatments, and labs reviewed  Continue medications and treatments as listed in EMR    Shala Kiran, APRN-CNP     No

## 2023-06-19 NOTE — LETTER
Patient: Paige Campoverde  : 11/15/1922    Encounter Date: 2023    PROGRESS NOTE    Subjective  Chief complaint: Paige Campoverde is a 89 y.o. female who is a long term care patient being seen and evaluated for fall    HPI:  Nurse called on Friday and reported patient had a fall sustaining a skin tear to the right shin and striking her head.  Patient states that she fell asleep while sitting in wheelchair and fell forward out of the chair.  Patient was sent to the ED for evaluation and returned with no new orders.  Patient states that she is feeling okay.  She denies headache and vision changes.  No other concerns at this time.      Objective  Vital signs: 140/78, 97.8, 70, 18, 93%    Physical Exam  Constitutional:       General: She is not in acute distress.  Eyes:      Extraocular Movements: Extraocular movements intact.   Pulmonary:      Effort: Pulmonary effort is normal.      Comments: O2  Musculoskeletal:         General: Normal range of motion.      Cervical back: Neck supple.      Right lower leg: Edema present.      Left lower leg: Edema present.   Skin:     Comments: Bruising to forehead and left lower extremity  Dressing intact to the right lower extremity   Neurological:      Mental Status: She is alert.   Psychiatric:         Mood and Affect: Mood normal.         Behavior: Behavior is cooperative.         Assessment/Plan  Problem List Items Addressed This Visit       Closed head injury     Evaluation in ED unremarkable  Monitor neurochecks         Fall     Sustained bruising to forehead and left lower extremity as well as skin tear to the right lower extremity         Hypertension, essential     Blood pressure at goal for age  Monitor blood pressure         Weakness - Primary     Medications, treatments, and labs reviewed  Continue medications and treatments as listed in EMR    DANYA Hernandes      Electronically Signed By: DANYA Hernandes   23 12:39 PM

## 2023-06-25 PROBLEM — S09.90XA CLOSED HEAD INJURY: Status: ACTIVE | Noted: 2023-01-01

## 2023-06-25 PROBLEM — R53.1 WEAKNESS: Status: ACTIVE | Noted: 2023-01-01
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
## 2023-06-25 NOTE — ASSESSMENT & PLAN NOTE
Sustained bruising to forehead and left lower extremity as well as skin tear to the right lower extremity

## 2023-06-27 PROBLEM — E44.0 MODERATE PROTEIN-CALORIE MALNUTRITION (MULTI): Status: ACTIVE | Noted: 2023-01-01

## 2023-06-27 NOTE — LETTER
Patient: Paige Campoverde  : 11/15/1922    Encounter Date: 2023    PROGRESS NOTE    Subjective  Chief complaint: Paige Campoverde is a 89 y.o. female who is a long term care patient being seen and evaluated for new skin issues.     HPI:  Patient working with [facility] for comfort and care. Patient has poor PO intake and malnutrition. New open area noted by nursing staff to left shin.       Objective  Vital signs: 140/78,70,93%    Physical Exam  Constitutional:       General: She is not in acute distress.  Eyes:      Extraocular Movements: Extraocular movements intact.   Pulmonary:      Effort: Pulmonary effort is normal.      Comments: O2  Musculoskeletal:         General: Normal range of motion.      Cervical back: Neck supple.   Skin:     Comments: Dressing intact to the right lower extremity   Neurological:      Mental Status: She is alert.   Psychiatric:         Mood and Affect: Mood normal.         Behavior: Behavior is cooperative.         Assessment/Plan  Problem List Items Addressed This Visit       Wound of left ankle     Treatment in place           Moderate protein-calorie malnutrition (CMS/HCC) - Primary     Continue with hospice care           Medications, treatments, and labs reviewed  Continue medications and treatments as listed in EMR      Scribe Attestation  Nhi PICKERING Scribjorge a   attest that this documentation has been prepared under the direction and in the presence of Irene Fernandez MD.     Provider Attestation - Scribe documentation  All medical record entries made by the Scribe were at my direction and personally dictated by me. I have reviewed the chart and agree that the record accurately reflects my personal performance of the history, physical exam, discussion and plan.   Irene Fernandez MD      Electronically Signed By: Irene Fernandez MD   23  8:14 PM

## 2023-06-27 NOTE — PROGRESS NOTES
PROGRESS NOTE    Subjective   Chief complaint: Paige Campoverde is a 89 y.o. female who is a long term care patient being seen and evaluated for new skin issues.     HPI:  Patient working with [facility] for comfort and care. Patient has poor PO intake and malnutrition. New open area noted by nursing staff to left shin.       Objective   Vital signs: 140/78,70,93%    Physical Exam  Constitutional:       General: She is not in acute distress.  Eyes:      Extraocular Movements: Extraocular movements intact.   Pulmonary:      Effort: Pulmonary effort is normal.      Comments: O2  Musculoskeletal:         General: Normal range of motion.      Cervical back: Neck supple.   Skin:     Comments: Dressing intact to the right lower extremity   Neurological:      Mental Status: She is alert.   Psychiatric:         Mood and Affect: Mood normal.         Behavior: Behavior is cooperative.         Assessment/Plan   Problem List Items Addressed This Visit       Wound of left ankle     Treatment in place           Moderate protein-calorie malnutrition (CMS/HCC) - Primary     Continue with hospice care           Medications, treatments, and labs reviewed  Continue medications and treatments as listed in EMR      Scribe Attestation  I, Fransisco Sanchez   attest that this documentation has been prepared under the direction and in the presence of Irene Fernandez MD.     Provider Attestation - Scribe documentation  All medical record entries made by the Scribe were at my direction and personally dictated by me. I have reviewed the chart and agree that the record accurately reflects my personal performance of the history, physical exam, discussion and plan.   Irene Fernandez MD